# Patient Record
Sex: MALE | Race: WHITE | NOT HISPANIC OR LATINO | Employment: OTHER | URBAN - METROPOLITAN AREA
[De-identification: names, ages, dates, MRNs, and addresses within clinical notes are randomized per-mention and may not be internally consistent; named-entity substitution may affect disease eponyms.]

---

## 2017-01-05 NOTE — PATIENT DISCUSSION
MILD DRY EYE, OU: PRESCRIBED ARTIFICIAL TEARS PRN OU. RECOMMENDS OMEGA-3 FISH OIL WITH PRIMARY CARE PHYSICIANS APPROVAL. RETURN FOR FOLLOW-UP AS SCHEDULED OR SOONER IF SYMPTOMS WORSEN.

## 2017-01-05 NOTE — PATIENT DISCUSSION
EXOTROPIA WITH STABLE VISION AND ALIGNMENT. UPDATED GLASSES PRESCRIPTION GIVEN TO PATIENT. FOLLOW AS SCHEDULED.

## 2017-01-05 NOTE — PATIENT DISCUSSION
Exotropia Counseling:  Exotropia is a type of strabismus where the eyes turn outward. This can be one eye or both eyes. The eye turn may be constant or it may come and go. Treatment may include eye glasses or eye muscle surgery. An instructional handout was given to the family today that describes the diagnosis, treatment plan and duration of treatment. All questions were answered today in clinic.

## 2017-01-05 NOTE — PATIENT DISCUSSION
Cataract Counseling: Not visually significant to proceed with surgery at this time. I have discussed continuing with current spectacles versus updating. I have offered the patient a new spectacle prescription to fill if desires. Return to follow up as scheduled or sooner if symptoms arise.

## 2017-01-30 NOTE — PATIENT DISCUSSION
REFRACTION CHECK: PATIENT PREFERS HABITUAL RX OD. UNABLE TO ADAPT TO CYL CHANGE BUT OKAY WITH BCVA 20/30 OD. UPDATED LENS OS TO REFLECT TODAY'S REFRACTION. NEW SPEC RX PROVIDED. RETURN FOR FOLLOW UP WITH ANY FURTHER ISSUES.

## 2017-02-14 NOTE — PATIENT DISCUSSION
CONTACT LENS FOLLOW UP: CHANGED LENS RX OD ONLY. ORDERED NEW TRIALS TO BE SENT TO PATIENT HOME. RETURN FOR FOLLOW UP AS NEEDED.

## 2018-01-17 ENCOUNTER — ESTABLISHED COMPREHENSIVE EXAM (OUTPATIENT)
Dept: URBAN - METROPOLITAN AREA CLINIC 46 | Facility: CLINIC | Age: 63
End: 2018-01-17

## 2018-01-17 DIAGNOSIS — H35.413: ICD-10-CM

## 2018-01-17 DIAGNOSIS — H35.3130: ICD-10-CM

## 2018-01-17 DIAGNOSIS — H43.813: ICD-10-CM

## 2018-01-17 DIAGNOSIS — H40.023: ICD-10-CM

## 2018-01-17 PROCEDURE — 92015 DETERMINE REFRACTIVE STATE: CPT

## 2018-01-17 PROCEDURE — 92014 COMPRE OPH EXAM EST PT 1/>: CPT

## 2018-01-17 ASSESSMENT — VISUAL ACUITY
OD_SC: J5
OS_SC: J10
OS_CC: J1
OD_CC: 20/20
OS_SC: 20/30-1
OD_SC: 20/40
OS_CC: 20/40
OD_CC: J1

## 2018-04-17 ENCOUNTER — ESTABLISHED COMPREHENSIVE EXAM (OUTPATIENT)
Dept: URBAN - METROPOLITAN AREA CLINIC 46 | Facility: CLINIC | Age: 63
End: 2018-04-17

## 2018-04-17 DIAGNOSIS — H35.3121: ICD-10-CM

## 2018-04-17 DIAGNOSIS — H35.3111: ICD-10-CM

## 2018-04-17 DIAGNOSIS — H35.723: ICD-10-CM

## 2018-04-17 DIAGNOSIS — H35.30: ICD-10-CM

## 2018-04-17 DIAGNOSIS — E11.9: ICD-10-CM

## 2018-04-17 DIAGNOSIS — H35.363: ICD-10-CM

## 2018-04-17 DIAGNOSIS — H43.813: ICD-10-CM

## 2018-04-17 DIAGNOSIS — H35.413: ICD-10-CM

## 2018-04-17 DIAGNOSIS — B39.4: ICD-10-CM

## 2018-04-17 PROCEDURE — 92014 COMPRE OPH EXAM EST PT 1/>: CPT

## 2018-04-17 PROCEDURE — 92250 FUNDUS PHOTOGRAPHY W/I&R: CPT

## 2018-04-17 PROCEDURE — G8428 CUR MEDS NOT DOCUMENT: HCPCS

## 2018-04-17 PROCEDURE — 3072F LOW RISK FOR RETINOPATHY: CPT

## 2018-04-17 PROCEDURE — 9222550 BILAT EXTENDED OPHTHALMOSCOPY, FIRST

## 2018-04-17 PROCEDURE — G8756 NO BP MEASURE DOC: HCPCS

## 2018-04-17 PROCEDURE — 4177F TALK PT/CRGVR RE AREDS PREV: CPT

## 2018-04-17 PROCEDURE — 92235 FLUORESCEIN ANGRPH MLTIFRAME: CPT

## 2018-04-17 PROCEDURE — 2019F DILATED MACUL EXAM DONE: CPT

## 2018-04-17 PROCEDURE — 1036F TOBACCO NON-USER: CPT

## 2018-04-17 PROCEDURE — 2022F DILAT RTA XM EVC RTNOPTHY: CPT

## 2018-04-17 ASSESSMENT — VISUAL ACUITY
OD_CC: 20/20
OS_CC: 20/30

## 2018-04-17 ASSESSMENT — TONOMETRY
OS_IOP_MMHG: 15
OD_IOP_MMHG: 16

## 2018-07-09 NOTE — PATIENT DISCUSSION
POSTERIOR VITREOUS DETACHMENT WITH VITREOUS FLOATERS, OU: NO HOLES OR NO TEARS 360' WITH INDIRECT EXAM, OU. F&amp;F PRECAUTIONS REVIEWED WITH THE PATIENT. IF SYMPTOMS WORSEN OR FLASHES OCCUR, REFER TO DR. Solorzano Officer FOR EVALUATION.

## 2018-07-17 ENCOUNTER — FOLLOW UP (OUTPATIENT)
Dept: URBAN - METROPOLITAN AREA CLINIC 46 | Facility: CLINIC | Age: 63
End: 2018-07-17

## 2018-07-17 DIAGNOSIS — H35.413: ICD-10-CM

## 2018-07-17 DIAGNOSIS — H35.363: ICD-10-CM

## 2018-07-17 DIAGNOSIS — H40.023: ICD-10-CM

## 2018-07-17 DIAGNOSIS — H35.30: ICD-10-CM

## 2018-07-17 PROCEDURE — 92133 CPTRZD OPH DX IMG PST SGM ON: CPT

## 2018-07-17 PROCEDURE — 92012 INTRM OPH EXAM EST PATIENT: CPT

## 2018-07-17 ASSESSMENT — VISUAL ACUITY
OD_CC: 20/20-1
OS_CC: 20/20-1

## 2018-07-17 ASSESSMENT — TONOMETRY
OS_IOP_MMHG: 14
OD_IOP_MMHG: 14

## 2018-07-24 ENCOUNTER — ESTABLISHED PATIENT (OUTPATIENT)
Dept: URBAN - METROPOLITAN AREA CLINIC 46 | Facility: CLINIC | Age: 63
End: 2018-07-24

## 2018-07-24 DIAGNOSIS — H35.363: ICD-10-CM

## 2018-07-24 DIAGNOSIS — H35.3111: ICD-10-CM

## 2018-07-24 DIAGNOSIS — B39.4: ICD-10-CM

## 2018-07-24 DIAGNOSIS — E11.9: ICD-10-CM

## 2018-07-24 DIAGNOSIS — D31.42: ICD-10-CM

## 2018-07-24 DIAGNOSIS — H35.3121: ICD-10-CM

## 2018-07-24 DIAGNOSIS — H35.413: ICD-10-CM

## 2018-07-24 DIAGNOSIS — H43.813: ICD-10-CM

## 2018-07-24 DIAGNOSIS — H35.723: ICD-10-CM

## 2018-07-24 DIAGNOSIS — D31.41: ICD-10-CM

## 2018-07-24 PROCEDURE — G8756 NO BP MEASURE DOC: HCPCS

## 2018-07-24 PROCEDURE — 9222650 BILAT EXTENDED OPHTHALMOSCOPY, F/U

## 2018-07-24 PROCEDURE — 92242 FLUORESCEIN&ICG ANGIOGRAPHY: CPT

## 2018-07-24 PROCEDURE — 2022F DILAT RTA XM EVC RTNOPTHY: CPT

## 2018-07-24 PROCEDURE — 2019F DILATED MACUL EXAM DONE: CPT

## 2018-07-24 PROCEDURE — 1036F TOBACCO NON-USER: CPT

## 2018-07-24 PROCEDURE — G8428 CUR MEDS NOT DOCUMENT: HCPCS

## 2018-07-24 PROCEDURE — 92014 COMPRE OPH EXAM EST PT 1/>: CPT

## 2018-07-24 PROCEDURE — 4177F TALK PT/CRGVR RE AREDS PREV: CPT

## 2018-07-24 PROCEDURE — 92134 CPTRZ OPH DX IMG PST SGM RTA: CPT

## 2018-07-24 PROCEDURE — 3072F LOW RISK FOR RETINOPATHY: CPT

## 2018-07-24 ASSESSMENT — VISUAL ACUITY
OS_CC: 20/20
OD_CC: 20/20

## 2018-07-24 ASSESSMENT — TONOMETRY
OD_IOP_MMHG: 18
OS_IOP_MMHG: 19

## 2018-11-27 ENCOUNTER — ESTABLISHED COMPREHENSIVE EXAM (OUTPATIENT)
Dept: URBAN - METROPOLITAN AREA CLINIC 46 | Facility: CLINIC | Age: 63
End: 2018-11-27

## 2018-11-27 DIAGNOSIS — H35.723: ICD-10-CM

## 2018-11-27 DIAGNOSIS — H35.413: ICD-10-CM

## 2018-11-27 DIAGNOSIS — H43.813: ICD-10-CM

## 2018-11-27 DIAGNOSIS — B39.4: ICD-10-CM

## 2018-11-27 DIAGNOSIS — D31.42: ICD-10-CM

## 2018-11-27 DIAGNOSIS — D31.41: ICD-10-CM

## 2018-11-27 DIAGNOSIS — H35.3111: ICD-10-CM

## 2018-11-27 DIAGNOSIS — H35.3121: ICD-10-CM

## 2018-11-27 DIAGNOSIS — H35.363: ICD-10-CM

## 2018-11-27 DIAGNOSIS — E11.9: ICD-10-CM

## 2018-11-27 PROCEDURE — 92014 COMPRE OPH EXAM EST PT 1/>: CPT

## 2018-11-27 PROCEDURE — 9222650 BILAT EXTENDED OPHTHALMOSCOPY, F/U

## 2018-11-27 PROCEDURE — 92134 CPTRZ OPH DX IMG PST SGM RTA: CPT

## 2018-11-27 PROCEDURE — 92242 FLUORESCEIN&ICG ANGIOGRAPHY: CPT

## 2018-11-27 ASSESSMENT — TONOMETRY
OD_IOP_MMHG: 18
OS_IOP_MMHG: 15

## 2018-11-27 ASSESSMENT — VISUAL ACUITY
OS_CC: 20/25
OD_CC: 20/20-1

## 2019-03-11 NOTE — PATIENT DISCUSSION
CMA w/ PRESBYOPIA: Educated patient about diagnosis. New spectacle prescription given to patient. Educated patient about differences and advantages/disadvantages of NVO, DVO, bifocal, and progressive lens style spectacles. Will continue to monitor. FINAL CLRX GIVEN TO PATIENT. EDUCATED PATIENT ABOUT CL REPLACEMENT SCHEDULE AND NOT TO SLEEP IN CONTACT LENSES.

## 2019-03-11 NOTE — PATIENT DISCUSSION
GLAUCOMA SUSPECT OU: DUE TO POSITIVE FAMILY HISTORY (FATHER) AND BORDERLINE HIGHER IOPS (HIGH IOP LAST EXAM WITH DR. CARSON) WILL START GLAUCOMA TESTING. IF TESTING WNL, WILL SEE YEARLY.

## 2019-05-28 ENCOUNTER — FOLLOW UP (OUTPATIENT)
Dept: URBAN - METROPOLITAN AREA CLINIC 46 | Facility: CLINIC | Age: 64
End: 2019-05-28

## 2019-05-28 DIAGNOSIS — H35.363: ICD-10-CM

## 2019-05-28 DIAGNOSIS — B39.4: ICD-10-CM

## 2019-05-28 DIAGNOSIS — H35.3112: ICD-10-CM

## 2019-05-28 DIAGNOSIS — D31.41: ICD-10-CM

## 2019-05-28 DIAGNOSIS — H43.813: ICD-10-CM

## 2019-05-28 DIAGNOSIS — H35.723: ICD-10-CM

## 2019-05-28 DIAGNOSIS — H35.413: ICD-10-CM

## 2019-05-28 DIAGNOSIS — D31.42: ICD-10-CM

## 2019-05-28 DIAGNOSIS — E11.9: ICD-10-CM

## 2019-05-28 DIAGNOSIS — H35.3121: ICD-10-CM

## 2019-05-28 PROCEDURE — 92014 COMPRE OPH EXAM EST PT 1/>: CPT

## 2019-05-28 PROCEDURE — 92250 FUNDUS PHOTOGRAPHY W/I&R: CPT

## 2019-05-28 PROCEDURE — 9222650 BILAT EXTENDED OPHTHALMOSCOPY, F/U

## 2019-05-28 PROCEDURE — 92235 FLUORESCEIN ANGRPH MLTIFRAME: CPT

## 2019-05-28 PROCEDURE — 92134 CPTRZ OPH DX IMG PST SGM RTA: CPT

## 2019-05-28 ASSESSMENT — VISUAL ACUITY
OS_CC: 20/25
OD_CC: 20/20

## 2019-05-28 ASSESSMENT — TONOMETRY
OD_IOP_MMHG: 16
OS_IOP_MMHG: 15

## 2019-10-23 ENCOUNTER — ESTABLISHED COMPREHENSIVE EXAM (OUTPATIENT)
Dept: URBAN - METROPOLITAN AREA CLINIC 46 | Facility: CLINIC | Age: 64
End: 2019-10-23

## 2019-10-23 DIAGNOSIS — E11.9: ICD-10-CM

## 2019-10-23 DIAGNOSIS — H35.30: ICD-10-CM

## 2019-10-23 DIAGNOSIS — D31.42: ICD-10-CM

## 2019-10-23 DIAGNOSIS — H52.7: ICD-10-CM

## 2019-10-23 DIAGNOSIS — H40.023: ICD-10-CM

## 2019-10-23 DIAGNOSIS — H40.013: ICD-10-CM

## 2019-10-23 DIAGNOSIS — H35.363: ICD-10-CM

## 2019-10-23 DIAGNOSIS — D31.41: ICD-10-CM

## 2019-10-23 PROCEDURE — 92015 DETERMINE REFRACTIVE STATE: CPT

## 2019-10-23 PROCEDURE — 92133 CPTRZD OPH DX IMG PST SGM ON: CPT

## 2019-10-23 PROCEDURE — 92014 COMPRE OPH EXAM EST PT 1/>: CPT

## 2019-10-23 ASSESSMENT — TONOMETRY
OS_IOP_MMHG: 16
OD_IOP_MMHG: 16

## 2019-10-23 ASSESSMENT — VISUAL ACUITY
OD_SC: 20/30
OS_SC: 20/30
OS_CC: J1+
OS_CC: 20/25
OD_CC: J1
OS_SC: J10
OD_CC: 20/20
OD_SC: J5

## 2020-05-26 ENCOUNTER — ESTABLISHED PATIENT (OUTPATIENT)
Dept: URBAN - METROPOLITAN AREA CLINIC 46 | Facility: CLINIC | Age: 65
End: 2020-05-26

## 2020-05-26 DIAGNOSIS — H35.3122: ICD-10-CM

## 2020-05-26 DIAGNOSIS — D31.41: ICD-10-CM

## 2020-05-26 DIAGNOSIS — H20.013: ICD-10-CM

## 2020-05-26 DIAGNOSIS — H35.723: ICD-10-CM

## 2020-05-26 DIAGNOSIS — D31.42: ICD-10-CM

## 2020-05-26 DIAGNOSIS — H35.363: ICD-10-CM

## 2020-05-26 DIAGNOSIS — H43.813: ICD-10-CM

## 2020-05-26 DIAGNOSIS — H35.3112: ICD-10-CM

## 2020-05-26 DIAGNOSIS — H35.413: ICD-10-CM

## 2020-05-26 PROCEDURE — 92134 CPTRZ OPH DX IMG PST SGM RTA: CPT

## 2020-05-26 PROCEDURE — 92287 ANT SGM IMG IR FLRSCN ANGRPH: CPT

## 2020-05-26 PROCEDURE — 92014 COMPRE OPH EXAM EST PT 1/>: CPT

## 2020-05-26 PROCEDURE — 92201 OPSCPY EXTND RTA DRAW UNI/BI: CPT

## 2020-05-26 PROCEDURE — 92242 FLUORESCEIN&ICG ANGIOGRAPHY: CPT

## 2020-05-26 ASSESSMENT — VISUAL ACUITY
OD_CC: 20/20
OS_CC: 20/20

## 2020-08-20 NOTE — PATIENT DISCUSSION
PINGUECULA:  I have advised the patient that pinguecula are usually stable over time and that surgical intervention is rarely indicated. Artificial tears and sunglasses may help prevent exacerbation of the condition.

## 2020-10-23 ENCOUNTER — ESTABLISHED COMPREHENSIVE EXAM (OUTPATIENT)
Dept: URBAN - METROPOLITAN AREA CLINIC 46 | Facility: CLINIC | Age: 65
End: 2020-10-23

## 2020-10-23 DIAGNOSIS — H01.023: ICD-10-CM

## 2020-10-23 DIAGNOSIS — H35.723: ICD-10-CM

## 2020-10-23 DIAGNOSIS — H52.7: ICD-10-CM

## 2020-10-23 DIAGNOSIS — H35.3122: ICD-10-CM

## 2020-10-23 DIAGNOSIS — H01.026: ICD-10-CM

## 2020-10-23 DIAGNOSIS — H40.013: ICD-10-CM

## 2020-10-23 DIAGNOSIS — H35.3112: ICD-10-CM

## 2020-10-23 DIAGNOSIS — H25.9: ICD-10-CM

## 2020-10-23 PROCEDURE — 92015 DETERMINE REFRACTIVE STATE: CPT

## 2020-10-23 PROCEDURE — 92014 COMPRE OPH EXAM EST PT 1/>: CPT

## 2020-10-23 ASSESSMENT — VISUAL ACUITY
OD_CC: J1+
OD_SC: 20/30
OD_SC: J5
OU_CC: J1+
OS_SC: 20/25+1
OU_CC: 20/15-2
OU_SC: 20/20
OD_CC: 20/20
OS_SC: J6
OS_CC: 20/20+3
OS_CC: J1+

## 2020-10-23 ASSESSMENT — TONOMETRY
OS_IOP_MMHG: 15
OD_IOP_MMHG: 17

## 2020-11-24 ENCOUNTER — ESTABLISHED COMPREHENSIVE EXAM (OUTPATIENT)
Dept: URBAN - METROPOLITAN AREA CLINIC 46 | Facility: CLINIC | Age: 65
End: 2020-11-24

## 2020-11-24 DIAGNOSIS — H35.3122: ICD-10-CM

## 2020-11-24 DIAGNOSIS — H35.30: ICD-10-CM

## 2020-11-24 DIAGNOSIS — H35.723: ICD-10-CM

## 2020-11-24 DIAGNOSIS — H35.3191: ICD-10-CM

## 2020-11-24 DIAGNOSIS — H35.3132: ICD-10-CM

## 2020-11-24 DIAGNOSIS — H35.3112: ICD-10-CM

## 2020-11-24 PROCEDURE — 92014 COMPRE OPH EXAM EST PT 1/>: CPT

## 2020-11-24 PROCEDURE — 92134 CPTRZ OPH DX IMG PST SGM RTA: CPT

## 2020-11-24 PROCEDURE — 92202 OPSCPY EXTND ON/MAC DRAW: CPT

## 2020-11-24 ASSESSMENT — VISUAL ACUITY
OD_CC: 20/20-1
OS_CC: 20/20

## 2020-11-24 ASSESSMENT — TONOMETRY
OD_IOP_MMHG: 17
OS_IOP_MMHG: 15

## 2021-05-25 ENCOUNTER — ESTABLISHED COMPREHENSIVE EXAM (OUTPATIENT)
Dept: URBAN - METROPOLITAN AREA CLINIC 46 | Facility: CLINIC | Age: 66
End: 2021-05-25

## 2021-05-25 DIAGNOSIS — D31.42: ICD-10-CM

## 2021-05-25 DIAGNOSIS — H35.3132: ICD-10-CM

## 2021-05-25 DIAGNOSIS — H35.363: ICD-10-CM

## 2021-05-25 DIAGNOSIS — H35.413: ICD-10-CM

## 2021-05-25 DIAGNOSIS — H43.813: ICD-10-CM

## 2021-05-25 DIAGNOSIS — H35.723: ICD-10-CM

## 2021-05-25 DIAGNOSIS — D31.41: ICD-10-CM

## 2021-05-25 DIAGNOSIS — H20.013: ICD-10-CM

## 2021-05-25 PROCEDURE — 92202 OPSCPY EXTND ON/MAC DRAW: CPT

## 2021-05-25 PROCEDURE — 92242 FLUORESCEIN&ICG ANGIOGRAPHY: CPT

## 2021-05-25 PROCEDURE — 92287 ANT SGM IMG IR FLRSCN ANGRPH: CPT

## 2021-05-25 PROCEDURE — 99214 OFFICE O/P EST MOD 30 MIN: CPT

## 2021-05-25 PROCEDURE — 92134 CPTRZ OPH DX IMG PST SGM RTA: CPT

## 2021-05-25 ASSESSMENT — VISUAL ACUITY
OD_SC: 20/20
OS_SC: 20/20

## 2021-05-25 ASSESSMENT — TONOMETRY
OS_IOP_MMHG: 14
OD_IOP_MMHG: 15

## 2021-08-23 NOTE — PATIENT DISCUSSION
GLAUCOMA SUSPECT, OU - BORDERLINE HIGH IOP BUT STABLE SINCE 2018. +FHX (FATHER). THICKER THAN AVG CCT OU. OCT TODAY SHOWS SUPERIOR RNFL THINNING OD (STABLE) AND NO RNFL THINNING OS. PATIENT ED ON IMPORTANCE OF MONITORING YEARLY. REPEAT RNFL OCT AT ANNUAL EXAM AND WILL ORDER VF IF ANY CHANGES.

## 2021-08-23 NOTE — PATIENT DISCUSSION
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 STRESSED IMPORTANCE OF GOOD BLOOD SUGAR CONTROL &amp; ROUTINE CARE WITH PCP AS DIRECTED.

## 2021-08-23 NOTE — PATIENT DISCUSSION
CATARACTS, OU - NOT VISUALLY SIGNIFICANT. UPDATED GLS AND CL RX GIVEN - PATIENT ED MUST REMOVE AFTER 6NIGHTS OF EW AND REPLACE Q1-2 WEEKS. MONITOR X 1 YEAR.

## 2021-08-23 NOTE — PATIENT DISCUSSION
Diabetes Counseling:  I have discussed with the patient the importance of controlling blood glucose, blood pressure and lipid levels to minimize the risk of developing retinal disease. The importance of an annual dilated eye exam and regular medical follow up care with the patient's PCP was explained.

## 2021-10-27 ENCOUNTER — ESTABLISHED COMPREHENSIVE EXAM (OUTPATIENT)
Dept: URBAN - METROPOLITAN AREA CLINIC 46 | Facility: CLINIC | Age: 66
End: 2021-10-27

## 2021-10-27 DIAGNOSIS — H35.363: ICD-10-CM

## 2021-10-27 DIAGNOSIS — H35.723: ICD-10-CM

## 2021-10-27 DIAGNOSIS — H35.3132: ICD-10-CM

## 2021-10-27 DIAGNOSIS — H52.7: ICD-10-CM

## 2021-10-27 DIAGNOSIS — H40.013: ICD-10-CM

## 2021-10-27 DIAGNOSIS — H35.413: ICD-10-CM

## 2021-10-27 DIAGNOSIS — H01.023: ICD-10-CM

## 2021-10-27 DIAGNOSIS — H01.026: ICD-10-CM

## 2021-10-27 DIAGNOSIS — H25.813: ICD-10-CM

## 2021-10-27 PROCEDURE — 92015 DETERMINE REFRACTIVE STATE: CPT

## 2021-10-27 PROCEDURE — 92014 COMPRE OPH EXAM EST PT 1/>: CPT

## 2021-10-27 ASSESSMENT — VISUAL ACUITY
OU_CC: 20/20+2
OS_CC: 20/20
OS_CC: J1+
OU_CC: J1+
OD_CC: J1+
OD_CC: 20/20

## 2021-10-27 ASSESSMENT — TONOMETRY
OD_IOP_MMHG: 15
OS_IOP_MMHG: 15

## 2022-06-07 ENCOUNTER — COMPREHENSIVE EXAM (OUTPATIENT)
Dept: URBAN - METROPOLITAN AREA CLINIC 46 | Facility: CLINIC | Age: 67
End: 2022-06-07

## 2022-06-07 DIAGNOSIS — H20.013: ICD-10-CM

## 2022-06-07 DIAGNOSIS — H35.363: ICD-10-CM

## 2022-06-07 DIAGNOSIS — H35.723: ICD-10-CM

## 2022-06-07 DIAGNOSIS — H40.013: ICD-10-CM

## 2022-06-07 DIAGNOSIS — H01.023: ICD-10-CM

## 2022-06-07 DIAGNOSIS — H35.413: ICD-10-CM

## 2022-06-07 DIAGNOSIS — H01.026: ICD-10-CM

## 2022-06-07 DIAGNOSIS — H35.3132: ICD-10-CM

## 2022-06-07 PROCEDURE — 92242 FLUORESCEIN&ICG ANGIOGRAPHY: CPT

## 2022-06-07 PROCEDURE — 99214 OFFICE O/P EST MOD 30 MIN: CPT

## 2022-06-07 PROCEDURE — 92134 CPTRZ OPH DX IMG PST SGM RTA: CPT

## 2022-06-07 PROCEDURE — 92287 ANT SGM IMG IR FLRSCN ANGRPH: CPT

## 2022-06-07 ASSESSMENT — TONOMETRY
OD_IOP_MMHG: 18
OS_IOP_MMHG: 15

## 2022-06-07 ASSESSMENT — VISUAL ACUITY
OS_CC: 20/20
OD_CC: 20/20

## 2022-07-11 ENCOUNTER — ESTABLISHED PATIENT (OUTPATIENT)
Dept: URBAN - METROPOLITAN AREA CLINIC 43 | Facility: CLINIC | Age: 67
End: 2022-07-11

## 2022-07-11 DIAGNOSIS — H35.30: ICD-10-CM

## 2022-07-11 DIAGNOSIS — B39.4: ICD-10-CM

## 2022-07-11 DIAGNOSIS — H35.363: ICD-10-CM

## 2022-07-11 DIAGNOSIS — H35.723: ICD-10-CM

## 2022-07-11 DIAGNOSIS — H40.013: ICD-10-CM

## 2022-07-11 DIAGNOSIS — H20.013: ICD-10-CM

## 2022-07-11 DIAGNOSIS — H35.413: ICD-10-CM

## 2022-07-11 DIAGNOSIS — H35.3132: ICD-10-CM

## 2022-07-11 DIAGNOSIS — H43.813: ICD-10-CM

## 2022-07-11 PROCEDURE — 99213 OFFICE O/P EST LOW 20 MIN: CPT

## 2022-07-11 PROCEDURE — 92250 FUNDUS PHOTOGRAPHY W/I&R: CPT

## 2022-07-11 ASSESSMENT — TONOMETRY
OS_IOP_MMHG: 16
OD_IOP_MMHG: 19

## 2022-07-11 ASSESSMENT — VISUAL ACUITY
OS_SC: 20/20-2
OD_SC: 20/20-1

## 2023-08-08 ENCOUNTER — COMPREHENSIVE EXAM (OUTPATIENT)
Dept: URBAN - METROPOLITAN AREA CLINIC 46 | Facility: CLINIC | Age: 68
End: 2023-08-08

## 2023-08-08 DIAGNOSIS — H35.413: ICD-10-CM

## 2023-08-08 DIAGNOSIS — H35.3132: ICD-10-CM

## 2023-08-08 DIAGNOSIS — B39.4: ICD-10-CM

## 2023-08-08 DIAGNOSIS — D31.41: ICD-10-CM

## 2023-08-08 DIAGNOSIS — H35.30: ICD-10-CM

## 2023-08-08 DIAGNOSIS — H35.723: ICD-10-CM

## 2023-08-08 DIAGNOSIS — H35.363: ICD-10-CM

## 2023-08-08 DIAGNOSIS — E11.9: ICD-10-CM

## 2023-08-08 DIAGNOSIS — D31.42: ICD-10-CM

## 2023-08-08 DIAGNOSIS — H20.013: ICD-10-CM

## 2023-08-08 DIAGNOSIS — H43.813: ICD-10-CM

## 2023-08-08 PROCEDURE — 92202 OPSCPY EXTND ON/MAC DRAW: CPT

## 2023-08-08 PROCEDURE — 92242 FLUORESCEIN&ICG ANGIOGRAPHY: CPT

## 2023-08-08 PROCEDURE — 92287 ANT SGM IMG IR FLRSCN ANGRPH: CPT

## 2023-08-08 PROCEDURE — 92134 CPTRZ OPH DX IMG PST SGM RTA: CPT

## 2023-08-08 PROCEDURE — 99214 OFFICE O/P EST MOD 30 MIN: CPT

## 2023-08-08 ASSESSMENT — TONOMETRY
OS_IOP_MMHG: 14
OD_IOP_MMHG: 18

## 2023-08-08 ASSESSMENT — VISUAL ACUITY
OS_CC: 20/20
OD_CC: 20/20

## 2024-02-29 ENCOUNTER — COMPREHENSIVE EXAM (OUTPATIENT)
Dept: URBAN - METROPOLITAN AREA CLINIC 46 | Facility: CLINIC | Age: 69
End: 2024-02-29

## 2024-02-29 DIAGNOSIS — D31.42: ICD-10-CM

## 2024-02-29 DIAGNOSIS — H35.363: ICD-10-CM

## 2024-02-29 DIAGNOSIS — E11.9: ICD-10-CM

## 2024-02-29 DIAGNOSIS — H35.30: ICD-10-CM

## 2024-02-29 DIAGNOSIS — H35.3211: ICD-10-CM

## 2024-02-29 DIAGNOSIS — H35.3122: ICD-10-CM

## 2024-02-29 DIAGNOSIS — H20.013: ICD-10-CM

## 2024-02-29 DIAGNOSIS — H35.413: ICD-10-CM

## 2024-02-29 DIAGNOSIS — B39.4: ICD-10-CM

## 2024-02-29 DIAGNOSIS — D31.41: ICD-10-CM

## 2024-02-29 DIAGNOSIS — H43.813: ICD-10-CM

## 2024-02-29 PROCEDURE — 92134 CPTRZ OPH DX IMG PST SGM RTA: CPT

## 2024-02-29 PROCEDURE — 99214 OFFICE O/P EST MOD 30 MIN: CPT

## 2024-02-29 ASSESSMENT — VISUAL ACUITY
OS_CC: 20/15
OD_CC: 20/20
OU_CC: J1
OD_CC: J1
OS_CC: J1
OU_CC: 20/15

## 2024-02-29 ASSESSMENT — TONOMETRY
OS_IOP_MMHG: 16
OD_IOP_MMHG: 17

## 2024-08-13 ENCOUNTER — COMPREHENSIVE EXAM (OUTPATIENT)
Dept: URBAN - METROPOLITAN AREA CLINIC 39 | Facility: CLINIC | Age: 69
End: 2024-08-13

## 2024-09-03 ENCOUNTER — COMPREHENSIVE EXAM (OUTPATIENT)
Dept: URBAN - METROPOLITAN AREA CLINIC 46 | Facility: CLINIC | Age: 69
End: 2024-09-03

## 2024-09-03 DIAGNOSIS — H35.363: ICD-10-CM

## 2024-09-03 DIAGNOSIS — E11.9: ICD-10-CM

## 2024-09-03 DIAGNOSIS — H35.722: ICD-10-CM

## 2024-09-03 DIAGNOSIS — H43.813: ICD-10-CM

## 2024-09-03 DIAGNOSIS — H35.3211: ICD-10-CM

## 2024-09-03 DIAGNOSIS — H35.3122: ICD-10-CM

## 2024-09-03 DIAGNOSIS — H20.013: ICD-10-CM

## 2024-09-03 DIAGNOSIS — H35.413: ICD-10-CM

## 2024-09-03 DIAGNOSIS — H35.30: ICD-10-CM

## 2024-09-03 DIAGNOSIS — D31.42: ICD-10-CM

## 2024-09-03 DIAGNOSIS — D31.41: ICD-10-CM

## 2024-09-03 DIAGNOSIS — B39.4: ICD-10-CM

## 2024-09-03 DIAGNOSIS — H35.731: ICD-10-CM

## 2024-09-03 PROCEDURE — 67028 INJECTION EYE DRUG: CPT

## 2024-09-03 PROCEDURE — 92134 CPTRZ OPH DX IMG PST SGM RTA: CPT

## 2024-09-03 PROCEDURE — 92242 FLUORESCEIN&ICG ANGIOGRAPHY: CPT

## 2024-09-03 PROCEDURE — 99214 OFFICE O/P EST MOD 30 MIN: CPT | Mod: 25

## 2024-10-15 ENCOUNTER — COMPREHENSIVE EXAM (OUTPATIENT)
Dept: URBAN - METROPOLITAN AREA CLINIC 46 | Facility: CLINIC | Age: 69
End: 2024-10-15

## 2024-10-15 DIAGNOSIS — H35.363: ICD-10-CM

## 2024-10-15 DIAGNOSIS — H35.3211: ICD-10-CM

## 2024-10-15 DIAGNOSIS — H35.413: ICD-10-CM

## 2024-10-15 DIAGNOSIS — H40.013: ICD-10-CM

## 2024-10-15 DIAGNOSIS — H43.813: ICD-10-CM

## 2024-10-15 DIAGNOSIS — D31.42: ICD-10-CM

## 2024-10-15 DIAGNOSIS — H20.013: ICD-10-CM

## 2024-10-15 DIAGNOSIS — H35.3122: ICD-10-CM

## 2024-10-15 DIAGNOSIS — H35.731: ICD-10-CM

## 2024-10-15 DIAGNOSIS — D31.41: ICD-10-CM

## 2024-10-15 DIAGNOSIS — H35.30: ICD-10-CM

## 2024-10-15 DIAGNOSIS — B39.4: ICD-10-CM

## 2024-10-15 DIAGNOSIS — H35.722: ICD-10-CM

## 2024-10-15 DIAGNOSIS — E11.9: ICD-10-CM

## 2024-10-15 PROCEDURE — 92250 FUNDUS PHOTOGRAPHY W/I&R: CPT

## 2024-10-15 PROCEDURE — 99213 OFFICE O/P EST LOW 20 MIN: CPT | Mod: 25

## 2024-10-15 PROCEDURE — 67028 INJECTION EYE DRUG: CPT

## 2024-11-26 ENCOUNTER — COMPREHENSIVE EXAM (OUTPATIENT)
Dept: URBAN - METROPOLITAN AREA CLINIC 46 | Facility: CLINIC | Age: 69
End: 2024-11-26

## 2024-11-26 DIAGNOSIS — H35.363: ICD-10-CM

## 2024-11-26 DIAGNOSIS — H40.013: ICD-10-CM

## 2024-11-26 DIAGNOSIS — H35.722: ICD-10-CM

## 2024-11-26 DIAGNOSIS — H35.413: ICD-10-CM

## 2024-11-26 DIAGNOSIS — H43.813: ICD-10-CM

## 2024-11-26 DIAGNOSIS — E11.9: ICD-10-CM

## 2024-11-26 DIAGNOSIS — H35.3211: ICD-10-CM

## 2024-11-26 DIAGNOSIS — H35.731: ICD-10-CM

## 2024-11-26 DIAGNOSIS — H35.30: ICD-10-CM

## 2024-11-26 DIAGNOSIS — H35.3122: ICD-10-CM

## 2024-11-26 DIAGNOSIS — B39.4: ICD-10-CM

## 2024-11-26 PROCEDURE — 99214 OFFICE O/P EST MOD 30 MIN: CPT | Mod: 25

## 2024-11-26 PROCEDURE — 92250 FUNDUS PHOTOGRAPHY W/I&R: CPT

## 2024-11-26 PROCEDURE — 92273 FULL FIELD ERG W/I&R: CPT

## 2024-11-26 PROCEDURE — 67028 INJECTION EYE DRUG: CPT

## 2024-11-26 PROCEDURE — J0178PRE EYLEA PREFILLED SYRINGE: Mod: JZ,RT

## 2025-01-07 ENCOUNTER — CLINIC PROCEDURE ONLY (OUTPATIENT)
Age: 70
End: 2025-01-07

## 2025-01-07 DIAGNOSIS — H35.731: ICD-10-CM

## 2025-01-07 DIAGNOSIS — H35.3211: ICD-10-CM

## 2025-01-07 PROCEDURE — 92134 CPTRZ OPH DX IMG PST SGM RTA: CPT

## 2025-01-07 PROCEDURE — 92242 FLUORESCEIN&ICG ANGIOGRAPHY: CPT

## 2025-01-07 PROCEDURE — 67028 INJECTION EYE DRUG: CPT

## 2025-01-07 PROCEDURE — J0178PRE EYLEA PREFILLED SYRINGE: Mod: JZ,RT

## 2025-03-04 ENCOUNTER — COMPREHENSIVE EXAM (OUTPATIENT)
Age: 70
End: 2025-03-04

## 2025-03-04 DIAGNOSIS — H35.3211: ICD-10-CM

## 2025-03-04 DIAGNOSIS — E11.9: ICD-10-CM

## 2025-03-04 DIAGNOSIS — H35.3122: ICD-10-CM

## 2025-03-04 DIAGNOSIS — H35.731: ICD-10-CM

## 2025-03-04 DIAGNOSIS — H40.013: ICD-10-CM

## 2025-03-04 DIAGNOSIS — H35.413: ICD-10-CM

## 2025-03-04 DIAGNOSIS — H43.813: ICD-10-CM

## 2025-03-04 PROCEDURE — 92134 CPTRZ OPH DX IMG PST SGM RTA: CPT

## 2025-03-04 PROCEDURE — 67028 INJECTION EYE DRUG: CPT

## 2025-03-04 PROCEDURE — J0178PRE EYLEA PREFILLED SYRINGE: Mod: JZ,RT

## 2025-03-04 PROCEDURE — 99213 OFFICE O/P EST LOW 20 MIN: CPT | Mod: 25

## 2025-03-06 ENCOUNTER — COMPREHENSIVE EXAM (OUTPATIENT)
Age: 70
End: 2025-03-06

## 2025-03-06 DIAGNOSIS — H01.026: ICD-10-CM

## 2025-03-06 DIAGNOSIS — H25.813: ICD-10-CM

## 2025-03-06 DIAGNOSIS — D31.42: ICD-10-CM

## 2025-03-06 DIAGNOSIS — H01.023: ICD-10-CM

## 2025-03-06 DIAGNOSIS — H35.363: ICD-10-CM

## 2025-03-06 DIAGNOSIS — D31.41: ICD-10-CM

## 2025-03-06 DIAGNOSIS — H40.013: ICD-10-CM

## 2025-03-06 DIAGNOSIS — H35.3211: ICD-10-CM

## 2025-03-06 DIAGNOSIS — H20.013: ICD-10-CM

## 2025-03-06 DIAGNOSIS — E11.9: ICD-10-CM

## 2025-03-06 DIAGNOSIS — H35.413: ICD-10-CM

## 2025-03-06 DIAGNOSIS — H35.3122: ICD-10-CM

## 2025-03-06 DIAGNOSIS — B39.4: ICD-10-CM

## 2025-03-06 DIAGNOSIS — H43.813: ICD-10-CM

## 2025-03-06 PROCEDURE — 92133 CPTRZD OPH DX IMG PST SGM ON: CPT

## 2025-03-06 PROCEDURE — 92014 COMPRE OPH EXAM EST PT 1/>: CPT

## 2025-04-29 ENCOUNTER — COMPREHENSIVE EXAM (OUTPATIENT)
Age: 70
End: 2025-04-29

## 2025-04-29 DIAGNOSIS — H40.013: ICD-10-CM

## 2025-04-29 DIAGNOSIS — H35.3211: ICD-10-CM

## 2025-04-29 DIAGNOSIS — H35.731: ICD-10-CM

## 2025-04-29 DIAGNOSIS — E11.9: ICD-10-CM

## 2025-04-29 DIAGNOSIS — H43.813: ICD-10-CM

## 2025-04-29 DIAGNOSIS — H35.413: ICD-10-CM

## 2025-04-29 DIAGNOSIS — H35.3122: ICD-10-CM

## 2025-04-29 PROCEDURE — J2777PFS VABYSMO PFS: Mod: JZ,RT

## 2025-04-29 PROCEDURE — 92273 FULL FIELD ERG W/I&R: CPT

## 2025-04-29 PROCEDURE — 99214 OFFICE O/P EST MOD 30 MIN: CPT | Mod: 25

## 2025-04-29 PROCEDURE — 67028 INJECTION EYE DRUG: CPT

## 2025-04-29 PROCEDURE — 92134 CPTRZ OPH DX IMG PST SGM RTA: CPT

## 2025-04-29 PROCEDURE — 92242 FLUORESCEIN&ICG ANGIOGRAPHY: CPT

## 2025-06-24 ENCOUNTER — CLINIC PROCEDURE ONLY (OUTPATIENT)
Age: 70
End: 2025-06-24

## 2025-06-24 DIAGNOSIS — H35.3211: ICD-10-CM

## 2025-06-24 DIAGNOSIS — H35.731: ICD-10-CM

## 2025-06-24 PROCEDURE — J2777PFS VABYSMO PFS: Mod: JZ,RT

## 2025-06-24 PROCEDURE — 92250 FUNDUS PHOTOGRAPHY W/I&R: CPT

## 2025-06-24 PROCEDURE — 67028 INJECTION EYE DRUG: CPT
